# Patient Record
Sex: FEMALE | URBAN - METROPOLITAN AREA
[De-identification: names, ages, dates, MRNs, and addresses within clinical notes are randomized per-mention and may not be internally consistent; named-entity substitution may affect disease eponyms.]

---

## 2017-03-10 ENCOUNTER — OFFICE VISIT (OUTPATIENT)
Dept: INTERNAL MEDICINE CLINIC | Age: 37
End: 2017-03-10

## 2017-03-10 VITALS
HEART RATE: 97 BPM | SYSTOLIC BLOOD PRESSURE: 122 MMHG | RESPIRATION RATE: 18 BRPM | OXYGEN SATURATION: 99 % | HEIGHT: 68 IN | WEIGHT: 152 LBS | TEMPERATURE: 99.5 F | BODY MASS INDEX: 23.04 KG/M2 | DIASTOLIC BLOOD PRESSURE: 60 MMHG

## 2017-03-10 DIAGNOSIS — J06.9 VIRAL URI WITH COUGH: Primary | ICD-10-CM

## 2017-03-10 RX ORDER — DEXTROMETHORPHAN POLISTIREX 30 MG/5ML
60 SUSPENSION ORAL 2 TIMES DAILY
COMMUNITY

## 2017-03-10 RX ORDER — ACETAMINOPHEN 325 MG/1
TABLET ORAL
COMMUNITY

## 2017-03-10 RX ORDER — BENZOCAINE .13; .15; .5; 2 G/100G; G/100G; G/100G; G/100G
2 GEL ORAL DAILY
COMMUNITY

## 2017-03-10 RX ORDER — LORATADINE 10 MG/1
10 TABLET ORAL
COMMUNITY

## 2017-03-10 NOTE — MR AVS SNAPSHOT
Visit Information Date & Time Provider Department Dept. Phone Encounter #  
 3/10/2017  2:00 PM Erum Thomson MD [place] [id number] [facility] [phone] [id number] Follow-up Instructions Return if symptoms worsen or fail to improve. Upcoming Health Maintenance Date Due Pneumococcal 19-64 Medium Risk (1 of 1 - PPSV23) 7/28/2000 PAP AKA CERVICAL CYTOLOGY 7/28/2002 INFLUENZA AGE 9 TO ADULT 8/1/2016 DTaP/Tdap/Td series (2 - Td) 7/21/2025 Allergies as of 3/10/2017  Review Complete On: 3/10/2017 By: Lelia Wynn No Known Allergies Current Immunizations  Reviewed on 7/21/2015 Name Date Tdap 7/21/2015 Not reviewed this visit You Were Diagnosed With   
  
 Codes Comments Viral URI with cough    -  Primary ICD-10-CM: J06.9, B97.89 ICD-9-CM: 465.9 Vitals BP Pulse Temp Resp Height(growth percentile) Weight(growth percentile) 122/60 (BP 1 Location: Left arm, BP Patient Position: Sitting) 97 99.5 °F (37.5 °C) (Oral) 18 5' 7.75\" (1.721 m) 152 lb (68.9 kg) SpO2 BMI OB Status Smoking Status 99% 23.28 kg/m2 Pregnant Light Tobacco Smoker Vitals History BMI and BSA Data Body Mass Index Body Surface Area  
 23.28 kg/m 2 1.81 m 2 Preferred Pharmacy Pharmacy Name Phone [id number] AT [address] [place] [phone] Your Updated Medication List  
  
   
This list is accurate as of: 3/10/17  3:05 PM.  Always use your most recent med list.  
  
  
  
  
 CLARITIN 10 mg tablet Generic drug:  loratadine Take 10 mg by mouth. Delsym 30 mg/5 mL liquid Generic drug:  dextromethorphan Take 60 mg by mouth two (2) times a day. hydrocortisone 25 mg Supp Commonly known as:  ANUSOL-HC Insert 1 Suppository into rectum every twelve (12) hours. prenatal multivit-ca-min-fe-fa Tab Take  by mouth. RHINOCORT AQUA 32 mcg/actuation nasal spray Generic drug:  budesonide 2 Sprays by Both Nostrils route daily. TYLENOL 325 mg tablet Generic drug:  acetaminophen Take  by mouth every four (4) hours as needed for Pain. Follow-up Instructions Return if symptoms worsen or fail to improve. Patient Instructions 1. Mucinex (Guaifenesen) plain-Blue Box 600 mg. Take one twice daily with full glass water Take for 10 days 2. Saline Nasal Spray - use liberally to flush post-nasal area; use as many times a day as desired. Keep spraying with head tilted back until you feel need to swallow 3. Drink lots of fluids (mainly water) to keep mucus thinner 4. If needed, for cough, we recommend Delsym cough syrup 5. Long acting antihistamine (Claritin/Loratadine, Allegra/Fexofenadine or Zyrtec/Ceterizine) is useful if allergy symptoms are also present 6. Decongestants should only be used for about 3 days. After that, they actually contribute to overdrying/thickening of the mucus, and can raise the BP and overstimulate the heart 7. Steroid nasal spray (Nasacort AQ) - 2 sprays each nostril once daily; use with head in upright position Introducing \[facility] [place] [facility]\"" & HEALTH SERVICES! Bassam Ornelas introduces [facility] patient portal. Now you can access parts of your medical record, email your doctor's office, and request medication refills online. 1. In your internet browser, go to [email or web address] 2. Click on the First Time User? Click Here link in the Sign In box. You will see the New Member Sign Up page. 3. Enter your Z[employer]u Access Code exactly as it appears below. You will not need to use this code after youve completed the sign-up process. If you do not sign up before the expiration date, you must request a new code. · [employer] Access Code: [email or web address] Expires: 6/8/2017  2:54 PM 
 
 4. Enter the last four digits of your Social Security Number (xxxx) and Date of Birth (mm/dd/yyyy) as indicated and click Submit. You will be taken to the next sign-up page. 5. Create a Orabrush ID. This will be your Orabrush login ID and cannot be changed, so think of one that is secure and easy to remember. 6. Create a Orabrush password. You can change your password at any time. 7. Enter your Password Reset Question and Answer. This can be used at a later time if you forget your password. 8. Enter your e-mail address. You will receive e-mail notification when new information is available in [address] 9. Click Sign Up. You can now view and download portions of your medical record. 10. Click the Download Summary menu link to download a portable copy of your medical information. If you have questions, please visit the Frequently Asked Questions section of the Orabrush website. Remember, Or[employer]rush is NOT to be used for urgent needs. For medical emergencies, dial 911. Now available from your iPhone and Android! Please provide this summary of care documentation to your next provider. Your primary care clinician is listed as Sylvia Joseph. If you have any questions after today's visit, please call [phone].

## 2017-03-10 NOTE — PROGRESS NOTES
HPI:  Candance Meeter is a 29y.o. year old female who returns to clinic today for an acute visit to discuss the problem(s) below:    9 weeks pregnant. Here for evaluation of cough x 1 day. Dry cough yesterday and spiked a fever, called ob who discussed OTC meds with her and told her to come see PCP today if not better. Cough productive today and mild wheeze, dry throat, headache. No chest pain or shortness of breath. Uses rhinocort regularly and a week ago started Claritin. Took tylenol and delsym cough syrup at advice of ob. Prior to Admission medications    Medication Sig Start Date End Date Taking? Authorizing Provider   budesonide (RHINOCORT AQUA) 32 mcg/actuation nasal spray 2 Sprays by Both Nostrils route daily. Yes Historical Provider   prenatal multivit-ca-min-fe-fa tab Take  by mouth. Yes Historical Provider   dextromethorphan (DELSYM) 30 mg/5 mL liquid Take 60 mg by mouth two (2) times a day. Yes Historical Provider   acetaminophen (TYLENOL) 325 mg tablet Take  by mouth every four (4) hours as needed for Pain. Yes Historical Provider   loratadine (CLARITIN) 10 mg tablet Take 10 mg by mouth. Yes Historical Provider   hydrocortisone (ANUSOL-HC) 25 mg supp Insert 1 Suppository into rectum every twelve (12) hours. 6/29/16   ALDEN Crespo          No Known Allergies        ROS  See HPI      Physical Exam   Constitutional: She appears well-nourished. She does not appear ill. No distress. HENT:   Right Ear: Tympanic membrane is not erythematous. No middle ear effusion. Left Ear: Tympanic membrane is not erythematous. No middle ear effusion. Nose: No mucosal edema or rhinorrhea. Right sinus exhibits no maxillary sinus tenderness and no frontal sinus tenderness. Left sinus exhibits no maxillary sinus tenderness and no frontal sinus tenderness. Mouth/Throat: No oropharyngeal exudate or posterior oropharyngeal erythema. Neck: Neck supple.    Cardiovascular: Normal rate, regular rhythm and normal heart sounds. No murmur heard. Pulmonary/Chest: Effort normal and breath sounds normal. She has no wheezes. She has no rales. Lymphadenopathy:     She has no cervical adenopathy. Visit Vitals    /60 (BP 1 Location: Left arm, BP Patient Position: Sitting)    Pulse 97    Temp 99.5 °F (37.5 °C) (Oral)    Resp 18    Ht 5' 7.75\" (1.721 m)    Wt 152 lb (68.9 kg)    SpO2 99%    BMI 23.28 kg/m2         Assessment & Plan:  Nirav Burk was seen today for cold symptoms. Diagnoses and all orders for this visit:    Viral URI with cough  Benign exam.  Discussed diagnosis & treatment options, most likely viral at this time, reviewed the importance of avoiding unnecessary antibiotic therapy, reviewed which OTC medications to use and avoid, expected time course for resolution & red flags were reviewed with her to RTC or notify me. Follow-up Disposition:  Return if symptoms worsen or fail to improve. Advised her to call back or return to office if symptoms worsen/change/persist.  Discussed expected course/resolution/complications of diagnosis in detail with patient. Medication risks/benefits/costs/interactions/alternatives discussed with patient. She was given an after visit summary which includes diagnoses, current medications, & vitals. She expressed understanding with the diagnosis and plan.

## 2017-03-10 NOTE — PROGRESS NOTES
1. Have you been to the ER, urgent care clinic since your last visit? Hospitalized since your last visit? No    2. Have you seen or consulted any other health care providers outside of the [address] [place] [address] since your last visit? Include any pap smears or colon screening.  Yes When: Dr Rina Quiroga for pregnacy   Chief Complaint   Patient presents with    Cold Symptoms     dry cough and low grade fever x 1 week